# Patient Record
Sex: MALE | Race: WHITE | NOT HISPANIC OR LATINO | Employment: OTHER | URBAN - METROPOLITAN AREA
[De-identification: names, ages, dates, MRNs, and addresses within clinical notes are randomized per-mention and may not be internally consistent; named-entity substitution may affect disease eponyms.]

---

## 2021-04-15 PROBLEM — E03.9 HYPOTHYROIDISM: Status: ACTIVE | Noted: 2017-02-15

## 2021-04-15 PROBLEM — E78.5 HYPERLIPIDEMIA: Status: ACTIVE | Noted: 2018-03-23

## 2021-04-29 ENCOUNTER — HOSPITAL ENCOUNTER (OUTPATIENT)
Dept: RADIOLOGY | Facility: HOSPITAL | Age: 71
Discharge: HOME | End: 2021-04-29
Attending: NURSE PRACTITIONER
Payer: MEDICARE

## 2021-04-29 DIAGNOSIS — J18.9 PNEUMONIA DUE TO INFECTIOUS ORGANISM, UNSPECIFIED LATERALITY, UNSPECIFIED PART OF LUNG: ICD-10-CM

## 2021-04-29 PROCEDURE — 71046 X-RAY EXAM CHEST 2 VIEWS: CPT | Mod: 26,,, | Performed by: RADIOLOGY

## 2021-04-29 PROCEDURE — 71046 X-RAY EXAM CHEST 2 VIEWS: CPT | Mod: TC,PO

## 2021-04-29 PROCEDURE — 71046 XR CHEST PA AND LATERAL: ICD-10-PCS | Mod: 26,,, | Performed by: RADIOLOGY

## 2021-05-12 ENCOUNTER — HOSPITAL ENCOUNTER (OUTPATIENT)
Dept: RADIOLOGY | Facility: HOSPITAL | Age: 71
Discharge: HOME | End: 2021-05-12
Attending: NURSE PRACTITIONER
Payer: MEDICARE

## 2021-05-12 DIAGNOSIS — J18.9 PNEUMONIA DUE TO INFECTIOUS ORGANISM, UNSPECIFIED LATERALITY, UNSPECIFIED PART OF LUNG: ICD-10-CM

## 2021-05-12 PROCEDURE — 71046 X-RAY EXAM CHEST 2 VIEWS: CPT | Mod: TC,PO

## 2021-05-12 PROCEDURE — 71046 X-RAY EXAM CHEST 2 VIEWS: CPT | Mod: 26,,, | Performed by: RADIOLOGY

## 2021-05-12 PROCEDURE — 71046 XR CHEST PA AND LATERAL: ICD-10-PCS | Mod: 26,,, | Performed by: RADIOLOGY

## 2021-10-20 ENCOUNTER — TELEPHONE (OUTPATIENT)
Dept: CARDIOLOGY | Facility: CLINIC | Age: 71
End: 2021-10-20

## 2022-03-03 PROBLEM — R41.3 MEMORY CHANGE: Status: ACTIVE | Noted: 2022-03-03

## 2022-03-03 PROBLEM — Z79.899 POLYPHARMACY: Status: ACTIVE | Noted: 2022-03-03

## 2022-08-24 PROBLEM — M54.9 CHRONIC BACK PAIN: Status: ACTIVE | Noted: 2022-08-24

## 2022-08-24 PROBLEM — G89.29 CHRONIC BACK PAIN: Status: ACTIVE | Noted: 2022-08-24

## 2022-08-24 PROBLEM — D64.9 ANEMIA: Status: ACTIVE | Noted: 2022-08-24

## 2022-08-29 PROBLEM — R73.03 PREDIABETES: Status: ACTIVE | Noted: 2022-08-29

## 2023-02-01 ENCOUNTER — CLINICAL SUPPORT (OUTPATIENT)
Dept: CARDIOLOGY | Facility: CLINIC | Age: 73
End: 2023-02-01
Payer: MEDICARE

## 2023-02-01 DIAGNOSIS — Z01.810 PRE-OPERATIVE CARDIOVASCULAR EXAMINATION: ICD-10-CM

## 2023-02-01 PROCEDURE — 93010 ELECTROCARDIOGRAM REPORT: CPT | Mod: S$GLB,,, | Performed by: INTERNAL MEDICINE

## 2023-02-01 PROCEDURE — 93010 EKG 12-LEAD: ICD-10-PCS | Mod: S$GLB,,, | Performed by: INTERNAL MEDICINE

## 2023-02-02 ENCOUNTER — TELEPHONE (OUTPATIENT)
Dept: CARDIOLOGY | Facility: CLINIC | Age: 73
End: 2023-02-02
Payer: MEDICARE

## 2023-02-02 NOTE — TELEPHONE ENCOUNTER
Pt notified  of appt date time and location pb    ----- Message from Flo Booker sent at 2/2/2023  2:00 PM CST -----  Contact: [phone]  Type:  Sooner Apoointment Request    Caller is requesting a sooner appointment.  Caller declined first available appointment listed below.  Caller will not accept being placed on the waitlist and is requesting a message be sent to doctor.  Name of Caller:Roge   When is the first available appointment?3/2023  Symptoms: pre op clearance   Would the patient rather a call back or a response via [employer]? Call back   Best Call Back Number:[phone]  Additional Information: pt goes to have surgery on 2/21.        Thanks KB

## 2023-02-08 ENCOUNTER — OFFICE VISIT (OUTPATIENT)
Dept: CARDIOLOGY | Facility: CLINIC | Age: 73
End: 2023-02-08
Payer: MEDICARE

## 2023-02-08 VITALS
SYSTOLIC BLOOD PRESSURE: 136 MMHG | OXYGEN SATURATION: 96 % | HEART RATE: 98 BPM | DIASTOLIC BLOOD PRESSURE: 80 MMHG | WEIGHT: 196.44 LBS | BODY MASS INDEX: 29.87 KG/M2

## 2023-02-08 DIAGNOSIS — Z79.899 POLYPHARMACY: ICD-10-CM

## 2023-02-08 DIAGNOSIS — Z01.810 PREOPERATIVE CARDIOVASCULAR EXAMINATION: ICD-10-CM

## 2023-02-08 DIAGNOSIS — G47.33 OBSTRUCTIVE SLEEP APNEA: ICD-10-CM

## 2023-02-08 DIAGNOSIS — G89.29 CHRONIC LOW BACK PAIN, UNSPECIFIED BACK PAIN LATERALITY, UNSPECIFIED WHETHER SCIATICA PRESENT: ICD-10-CM

## 2023-02-08 DIAGNOSIS — E78.2 MIXED HYPERLIPIDEMIA: Primary | ICD-10-CM

## 2023-02-08 DIAGNOSIS — I10 PRIMARY HYPERTENSION: ICD-10-CM

## 2023-02-08 DIAGNOSIS — M54.50 CHRONIC LOW BACK PAIN, UNSPECIFIED BACK PAIN LATERALITY, UNSPECIFIED WHETHER SCIATICA PRESENT: ICD-10-CM

## 2023-02-08 PROCEDURE — 3008F BODY MASS INDEX DOCD: CPT | Mod: CPTII,S$GLB,, | Performed by: INTERNAL MEDICINE

## 2023-02-08 PROCEDURE — 99999 PR PBB SHADOW E&M-EST. PATIENT-LVL III: CPT | Mod: PBBFAC,,, | Performed by: INTERNAL MEDICINE

## 2023-02-08 PROCEDURE — 99999 PR PBB SHADOW E&M-EST. PATIENT-LVL III: ICD-10-PCS | Mod: PBBFAC,,, | Performed by: INTERNAL MEDICINE

## 2023-02-08 PROCEDURE — 3044F PR MOST RECENT HEMOGLOBIN A1C LEVEL <7.0%: ICD-10-PCS | Mod: CPTII,S$GLB,, | Performed by: INTERNAL MEDICINE

## 2023-02-08 PROCEDURE — 3044F HG A1C LEVEL LT 7.0%: CPT | Mod: CPTII,S$GLB,, | Performed by: INTERNAL MEDICINE

## 2023-02-08 PROCEDURE — 1159F PR MEDICATION LIST DOCUMENTED IN MEDICAL RECORD: ICD-10-PCS | Mod: CPTII,S$GLB,, | Performed by: INTERNAL MEDICINE

## 2023-02-08 PROCEDURE — 99205 PR OFFICE/OUTPT VISIT, NEW, LEVL V, 60-74 MIN: ICD-10-PCS | Mod: S$GLB,,, | Performed by: INTERNAL MEDICINE

## 2023-02-08 PROCEDURE — 3079F DIAST BP 80-89 MM HG: CPT | Mod: CPTII,S$GLB,, | Performed by: INTERNAL MEDICINE

## 2023-02-08 PROCEDURE — 1159F MED LIST DOCD IN RCRD: CPT | Mod: CPTII,S$GLB,, | Performed by: INTERNAL MEDICINE

## 2023-02-08 PROCEDURE — 3075F PR MOST RECENT SYSTOLIC BLOOD PRESS GE 130-139MM HG: ICD-10-PCS | Mod: CPTII,S$GLB,, | Performed by: INTERNAL MEDICINE

## 2023-02-08 PROCEDURE — 3079F PR MOST RECENT DIASTOLIC BLOOD PRESSURE 80-89 MM HG: ICD-10-PCS | Mod: CPTII,S$GLB,, | Performed by: INTERNAL MEDICINE

## 2023-02-08 PROCEDURE — 4010F ACE/ARB THERAPY RXD/TAKEN: CPT | Mod: CPTII,S$GLB,, | Performed by: INTERNAL MEDICINE

## 2023-02-08 PROCEDURE — 3008F PR BODY MASS INDEX (BMI) DOCUMENTED: ICD-10-PCS | Mod: CPTII,S$GLB,, | Performed by: INTERNAL MEDICINE

## 2023-02-08 PROCEDURE — 4010F PR ACE/ARB THEARPY RXD/TAKEN: ICD-10-PCS | Mod: CPTII,S$GLB,, | Performed by: INTERNAL MEDICINE

## 2023-02-08 PROCEDURE — 99205 OFFICE O/P NEW HI 60 MIN: CPT | Mod: S$GLB,,, | Performed by: INTERNAL MEDICINE

## 2023-02-08 PROCEDURE — 3075F SYST BP GE 130 - 139MM HG: CPT | Mod: CPTII,S$GLB,, | Performed by: INTERNAL MEDICINE

## 2023-02-08 NOTE — PROGRESS NOTES
Subjective:   Patient ID:  Roge Roberson is a 72 y.o. male who presents for evaluation of No chief complaint on file.      HPI  72-year-old male, never smoker, no known history of CAD.    States that he had seen Dr. Barreto about 2 years ago.  He had a normal stress test.    He denies whatsoever any chest pain.  He has lumbar disease.  He is going for a lateral spine surgery to release his nerve.    He states that he has unbearable pain in his feet.    He denies any chest pain, dyspnea on exertion, palpitations syncope or presyncope.    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  He is only limited by his feet however not limited with his upper body and denies any pain with exertion.    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  [facility] shows also the same findings.    The EKG itself was not available for me.    He has severe obstructive sleep apnea.  He has stopped using his CPAP.  He states he can go back to it.      Past Medical History:   Diagnosis Date    Hypertension        Past Surgical History:   Procedure Laterality Date    CHOLECYSTECTOMY      INSERTION OF INTRATHECAL CATHETER WITH TRIAL ADMINISTRATION OF PAIN MEDICATION         Social History     Tobacco Use    Smoking status: Never    Smokeless tobacco: Never   Substance Use Topics    Alcohol use: Not Currently    Drug use: Never       History reviewed. No pertinent family history.    Review of Systems   Constitutional: Negative for fever and malaise/fatigue.   HENT:  Negative for sore throat.    Eyes:  Negative for blurred vision.   Cardiovascular:  Negative for chest pain, claudication, cyanosis, dyspnea on exertion, irregular heartbeat, leg swelling, near-syncope, orthopnea, palpitations, paroxysmal nocturnal dyspnea and syncope.   Respiratory:  Negative for cough and hemoptysis.    Hematologic/Lymphatic: Negative for bleeding problem.   Skin:   Negative for rash.   Musculoskeletal:  Positive for arthritis.   Gastrointestinal:  Negative for abdominal pain.   Genitourinary: Negative.    Neurological: Negative.    Psychiatric/Behavioral:  Negative for altered mental status and substance abuse.      Current Outpatient Medications on File Prior to Visit   Medication Sig    amLODIPine (NORVASC) 10 MG tablet Take 1 tablet (10 mg total) by mouth once daily.    FLUoxetine 20 MG capsule Take 1 capsule (20 mg total) by mouth 2 (two) times daily.    hydrOXYzine pamoate (VISTARIL) 50 MG Cap TAKE ONE CAPSULE BY MOUTH EVERY NIGHT AT BEDTIME FOR ANXIETY    levothyroxine (SYNTHROID) 75 MCG tablet TAKE 1 TABLET(75 MCG) BY MOUTH BEFORE BREAKFAST    linaCLOtide (LINZESS) 290 mcg Cap capsule Take 1 capsule (290 mcg total) by mouth once daily.    lisinopriL 10 MG tablet Take 1 tablet (10 mg total) by mouth once daily.    mirtazapine (REMERON) 30 MG tablet Take 0.5 tablets (15 mg total) by mouth nightly.    pregabalin (LYRICA) 150 MG capsule TAKE ONE CAPSULE BY MOUTH THREE TIMES DAILY FOR NERVE PAIN    rosuvastatin (CRESTOR) 20 MG tablet Take 1 tablet (20 mg total) by mouth every evening.    tiZANidine (ZANAFLEX) 4 MG tablet Take 4 mg by mouth 3 (three) times daily.    LIDOcaine (LIDODERM) 5 % APPLY 1 TO 3 PATCHES TOPICALLY TO THE SKIN EVERY DAY. MAY WEAR UP TO 12 HOURS     No current facility-administered medications on file prior to visit.       Objective:   Objective:  Wt Readings from Last 3 Encounters:   02/08/23 89.1 kg (196 lb 6.9 oz)   02/01/23 88.7 kg (195 lb 9.6 oz)   01/26/23 87.1 kg (192 lb)     Temp Readings from Last 3 Encounters:   07/27/21 98.5 °F (36.9 °C) (Oral)   05/11/21 98.2 °F (36.8 °C) (Oral)   04/29/21 98.4 °F (36.9 °C) (Oral)     BP Readings from Last 3 Encounters:   02/08/23 136/80   02/02/23 132/74   02/01/23 (!) 157/80     Pulse Readings from Last 3 Encounters:   02/08/23 98   02/01/23 82   01/26/23 74       Physical Exam  Vitals reviewed.    Constitutional:       Appearance: He is well-developed.   HENT:      Head: Normocephalic and atraumatic.   Eyes:      General: No scleral icterus.     Conjunctiva/sclera: Conjunctivae normal.   Cardiovascular:      Rate and Rhythm: Normal rate and regular rhythm.      Pulses: Intact distal pulses.      Heart sounds: Normal heart sounds. No murmur heard.  Pulmonary:      Effort: No respiratory distress.      Breath sounds: No wheezing or rales.   Chest:      Chest wall: No tenderness.   Abdominal:      General: Bowel sounds are normal. There is no distension.      Palpations: Abdomen is soft.      Tenderness: There is no guarding.   Musculoskeletal:         General: Normal range of motion.      Cervical back: Normal range of motion and neck supple.   Skin:     General: Skin is warm.   Neurological:      Mental Status: He is alert and oriented to person, place, and time.       Lab Results   Component Value Date    CHOL 153 08/26/2022    CHOL 114 03/09/2022    CHOL 152 11/03/2021     Lab Results   Component Value Date    HDL 43 08/26/2022    HDL 44 03/09/2022    HDL 39 (L) 11/03/2021     Lab Results   Component Value Date    LDLCALC 84 08/26/2022    LDLCALC 52 03/09/2022    LDLCALC 79 11/03/2021     Lab Results   Component Value Date    TRIG 147 08/26/2022    TRIG 92 03/09/2022    TRIG 200 (H) 11/03/2021     No results found for: CHOLHDL    Chemistry        Component Value Date/Time     01/27/2023 0724    K 4.2 01/27/2023 0724     01/27/2023 0724    CO2 26 01/27/2023 0724    BUN 20 01/27/2023 0724    CREATININE 0.72 (L) 01/27/2023 0724     (H) 01/27/2023 0724        Component Value Date/Time    CALCIUM 9.6 01/27/2023 0724    ALKPHOS 102 03/30/2005 0825    AST 28 01/27/2023 0724    ALT 26 01/27/2023 0724    BILITOT 0.3 01/27/2023 0724    EGFRNONAA 83 11/03/2021 0701          Lab Results   Component Value Date    TSH 1.220 08/26/2022     Lab Results   Component Value Date    INR CANCELED 02/01/2023      Lab Results   Component Value Date    WBC 6.0 01/27/2023    HGB 13.1 01/27/2023    HCT 40.0 01/27/2023    MCV 89 01/27/2023     01/27/2023     BNP  @LABRCNTIP(BNP,BNPTRIAGEBLO)@  CrCl cannot be calculated (Patient's most recent lab result is older than the maximum 7 days allowed.).     Imaging:  ======  No results found for this or any previous visit.    No results found for this or any previous visit.    No results found for this or any previous visit.    Results for orders placed during the hospital encounter of 05/12/21    X-Ray Chest PA And Lateral    Narrative  EXAMINATION:  XR CHEST PA AND LATERAL    CLINICAL HISTORY:  Pneumonia, unspecified organism    TECHNIQUE:  PA and lateral views of the chest were performed.    COMPARISON:  April 29, 2021    FINDINGS:  Persistent basilar areas of the scarring and or subsegmental atelectasis with slight increased prominence on the right.  No new or developing area of consolidation, effusion or pneumothorax.  Bilateral smooth apical pleural thickening.  Heart and pulmonary vasculature stable and aorta tortuous.  Osteopenia and spondylosis.    Impression  Stable exam.  There is persistent bibasilar scarring and or subsegmental atelectasis without developing consolidation or effusion.  See above.  Follow-up as warranted.      Electronically signed by: Sanket Suarez MD  Date:    05/12/2021  Time:    14:57    No results found for this or any previous visit.    No valid procedures specified.    Diagnostic Results:  ECG: Reviewed  Normal sinus rhythm.  Left anterior fascicular block.  Right bundle branch block.  The 10-year ASCVD risk score (Vineet DK, et al., 2019) is: 23.1%    Values used to calculate the score:      Age: 72 years      Sex: Male      Is Non- : No      Diabetic: No      Tobacco smoker: No      Systolic Blood Pressure: 136 mmHg      Is BP treated: Yes      HDL Cholesterol: 43 mg/dL      Total Cholesterol: 153  mg/dL    Assessment and Plan:   Mixed hyperlipidemia    Primary hypertension    Polypharmacy    Chronic low back pain, unspecified back pain laterality, unspecified whether sciatica present    Preoperative cardiovascular examination      Reviewed all tests and above medical conditions with patient in detail and formulated treatment plan.  Risk factor modification discussed.   Cardiac low salt diet discussed.  Maintaining healthy weight and weight loss goals were discussed in clinic.  Continue with statin perioperatively.    No history of insulin-dependent diabetes, CKD, CHF, CVA or CAD.  Revised cardiac index score of 0  No active cardiac complaints.  I see no contraindication for him to go for his spinal surgery.  He is stable from cardiac standpoint.    CPAP compliance  I reviewed his EKG.    Get records from [facility]  Follow up in 6 months

## 2023-02-13 ENCOUNTER — HOSPITAL ENCOUNTER (OUTPATIENT)
Dept: RADIOLOGY | Facility: HOSPITAL | Age: 73
Discharge: HOME | End: 2023-02-13
Attending: NURSE PRACTITIONER
Payer: MEDICARE

## 2023-02-13 DIAGNOSIS — Z01.810 PRE-OPERATIVE CARDIOVASCULAR EXAMINATION: ICD-10-CM

## 2023-02-13 PROCEDURE — 71046 X-RAY EXAM CHEST 2 VIEWS: CPT | Mod: 26,,, | Performed by: STUDENT IN AN ORGANIZED HEALTH CARE EDUCATION/TRAINING PROGRAM

## 2023-02-13 PROCEDURE — 71046 XR CHEST PA AND LATERAL: ICD-10-PCS | Mod: 26,,, | Performed by: STUDENT IN AN ORGANIZED HEALTH CARE EDUCATION/TRAINING PROGRAM

## 2023-02-13 PROCEDURE — 71046 X-RAY EXAM CHEST 2 VIEWS: CPT | Mod: TC,PO

## 2023-05-04 PROBLEM — F32.4 MAJOR DEPRESSIVE DISORDER WITH SINGLE EPISODE, IN PARTIAL REMISSION: Status: ACTIVE | Noted: 2023-05-04

## 2023-05-18 ENCOUNTER — HOSPITAL ENCOUNTER (OUTPATIENT)
Dept: RADIOLOGY | Facility: HOSPITAL | Age: 73
Discharge: HOME | End: 2023-05-18
Attending: NURSE PRACTITIONER
Payer: MEDICARE

## 2023-05-18 DIAGNOSIS — K59.00 CONSTIPATION, UNSPECIFIED CONSTIPATION TYPE: ICD-10-CM

## 2023-05-18 PROCEDURE — 74019 XR ABDOMEN FLAT AND ERECT: ICD-10-PCS | Mod: 26,,, | Performed by: RADIOLOGY

## 2023-05-18 PROCEDURE — 74019 RADEX ABDOMEN 2 VIEWS: CPT | Mod: TC,PO

## 2023-05-18 PROCEDURE — 74019 RADEX ABDOMEN 2 VIEWS: CPT | Mod: 26,,, | Performed by: RADIOLOGY

## 2023-11-30 ENCOUNTER — TELEPHONE (OUTPATIENT)
Dept: OTOLARYNGOLOGY | Facility: CLINIC | Age: 73
End: 2023-11-30
Payer: MEDICARE

## 2023-11-30 NOTE — TELEPHONE ENCOUNTER
----- Message from Sigrid Stringer sent at 11/30/2023  1:58 PM CST -----  Contact: Roge Guan is calling to speak to the nurse regarding scheduling for a audiogram and a appointment for a ent  he currently having ringing in his ears, I tried to schedule but it wouldn't allow me. Please give him a call at [phone]    Thanks  LJ

## 2023-12-12 ENCOUNTER — CLINICAL SUPPORT (OUTPATIENT)
Dept: AUDIOLOGY | Facility: CLINIC | Age: 73
End: 2023-12-12
Payer: MEDICARE

## 2023-12-12 ENCOUNTER — OFFICE VISIT (OUTPATIENT)
Dept: OTOLARYNGOLOGY | Facility: CLINIC | Age: 73
End: 2023-12-12
Payer: MEDICARE

## 2023-12-12 VITALS — WEIGHT: 190.06 LBS | BODY MASS INDEX: 28.89 KG/M2

## 2023-12-12 DIAGNOSIS — H93.13 TINNITUS, BILATERAL: ICD-10-CM

## 2023-12-12 DIAGNOSIS — H90.3 SENSORINEURAL HEARING LOSS, BILATERAL: Primary | ICD-10-CM

## 2023-12-12 DIAGNOSIS — H61.23 BILATERAL IMPACTED CERUMEN: ICD-10-CM

## 2023-12-12 DIAGNOSIS — H90.3 SENSORINEURAL HEARING LOSS (SNHL) OF BOTH EARS: Primary | ICD-10-CM

## 2023-12-12 DIAGNOSIS — H93.13 RINGING IN EARS, BILATERAL: ICD-10-CM

## 2023-12-12 PROCEDURE — 4010F PR ACE/ARB THEARPY RXD/TAKEN: ICD-10-PCS | Mod: CPTII,S$GLB,, | Performed by: OTOLARYNGOLOGY

## 2023-12-12 PROCEDURE — 3044F PR MOST RECENT HEMOGLOBIN A1C LEVEL <7.0%: ICD-10-PCS | Mod: CPTII,S$GLB,, | Performed by: OTOLARYNGOLOGY

## 2023-12-12 PROCEDURE — 3008F BODY MASS INDEX DOCD: CPT | Mod: CPTII,S$GLB,, | Performed by: OTOLARYNGOLOGY

## 2023-12-12 PROCEDURE — 92557 COMPREHENSIVE HEARING TEST: CPT | Mod: S$GLB,,, | Performed by: AUDIOLOGIST-HEARING AID FITTER

## 2023-12-12 PROCEDURE — 92557 PR COMPREHENSIVE HEARING TEST: ICD-10-PCS | Mod: S$GLB,,, | Performed by: AUDIOLOGIST-HEARING AID FITTER

## 2023-12-12 PROCEDURE — 3288F FALL RISK ASSESSMENT DOCD: CPT | Mod: CPTII,S$GLB,, | Performed by: OTOLARYNGOLOGY

## 2023-12-12 PROCEDURE — 1101F PR PT FALLS ASSESS DOC 0-1 FALLS W/OUT INJ PAST YR: ICD-10-PCS | Mod: CPTII,S$GLB,, | Performed by: OTOLARYNGOLOGY

## 2023-12-12 PROCEDURE — 4010F ACE/ARB THERAPY RXD/TAKEN: CPT | Mod: CPTII,S$GLB,, | Performed by: OTOLARYNGOLOGY

## 2023-12-12 PROCEDURE — 69210 REMOVE IMPACTED EAR WAX UNI: CPT | Mod: S$GLB,,, | Performed by: OTOLARYNGOLOGY

## 2023-12-12 PROCEDURE — 1159F PR MEDICATION LIST DOCUMENTED IN MEDICAL RECORD: ICD-10-PCS | Mod: CPTII,S$GLB,, | Performed by: OTOLARYNGOLOGY

## 2023-12-12 PROCEDURE — 92567 TYMPANOMETRY: CPT | Mod: S$GLB,,, | Performed by: AUDIOLOGIST-HEARING AID FITTER

## 2023-12-12 PROCEDURE — 1126F AMNT PAIN NOTED NONE PRSNT: CPT | Mod: CPTII,S$GLB,, | Performed by: OTOLARYNGOLOGY

## 2023-12-12 PROCEDURE — 92567 PR TYMPA2METRY: ICD-10-PCS | Mod: S$GLB,,, | Performed by: AUDIOLOGIST-HEARING AID FITTER

## 2023-12-12 PROCEDURE — 3044F HG A1C LEVEL LT 7.0%: CPT | Mod: CPTII,S$GLB,, | Performed by: OTOLARYNGOLOGY

## 2023-12-12 PROCEDURE — 99203 OFFICE O/P NEW LOW 30 MIN: CPT | Mod: 25,S$GLB,, | Performed by: OTOLARYNGOLOGY

## 2023-12-12 PROCEDURE — 1159F MED LIST DOCD IN RCRD: CPT | Mod: CPTII,S$GLB,, | Performed by: OTOLARYNGOLOGY

## 2023-12-12 PROCEDURE — 99203 PR OFFICE/OUTPT VISIT, NEW, LEVL III, 30-44 MIN: ICD-10-PCS | Mod: 25,S$GLB,, | Performed by: OTOLARYNGOLOGY

## 2023-12-12 PROCEDURE — 1126F PR PAIN SEVERITY QUANTIFIED, NO PAIN PRESENT: ICD-10-PCS | Mod: CPTII,S$GLB,, | Performed by: OTOLARYNGOLOGY

## 2023-12-12 PROCEDURE — 3008F PR BODY MASS INDEX (BMI) DOCUMENTED: ICD-10-PCS | Mod: CPTII,S$GLB,, | Performed by: OTOLARYNGOLOGY

## 2023-12-12 PROCEDURE — 99999 PR PBB SHADOW E&M-EST. PATIENT-LVL III: ICD-10-PCS | Mod: PBBFAC,,, | Performed by: OTOLARYNGOLOGY

## 2023-12-12 PROCEDURE — 99999 PR PBB SHADOW E&M-EST. PATIENT-LVL III: CPT | Mod: PBBFAC,,, | Performed by: OTOLARYNGOLOGY

## 2023-12-12 PROCEDURE — 1101F PT FALLS ASSESS-DOCD LE1/YR: CPT | Mod: CPTII,S$GLB,, | Performed by: OTOLARYNGOLOGY

## 2023-12-12 PROCEDURE — 3288F PR FALLS RISK ASSESSMENT DOCUMENTED: ICD-10-PCS | Mod: CPTII,S$GLB,, | Performed by: OTOLARYNGOLOGY

## 2023-12-12 PROCEDURE — 69210 PR REMOVAL IMPACTED CERUMEN REQUIRING INSTRUMENTATION, UNILATERAL: ICD-10-PCS | Mod: S$GLB,,, | Performed by: OTOLARYNGOLOGY

## 2023-12-12 NOTE — PROGRESS NOTES
Referring Provider:    Tabitha Mondragon, Sal  [address] [facility]  [address]  [place] [address]  Subjective:   Patient: Roge Roberson [id number], :1950   Visit date:2023 2:01 PM    Chief Complaint:  Tinnitus (Both ears are ringing but right is worse , started some months ago , )    HPI:    Prior notes reviewed by myself.  Clinical documentation obtained by nursing staff reviewed.     73-year-old gentleman presents for evaluation of bilateral nonpulsatile tinnitus.  He started noticing this more over the last few months.  He does not have any significant prior otologic history.  He did have a audiogram earlier today      Objective:     Physical Exam:  Vitals:  Wt 86.2 kg (190 lb 0.6 oz)   BMI 28.89 kg/m²   General appearance:  Well developed, well nourished    Ears:  Otoscopy of external auditory canals with 75% cerumen impaction and tympanic membranes was normal, clinical speech reception thresholds grossly intact, no mass/lesion of auricle.    Nose:  No masses/lesions of external nose, nasal mucosa, septum, and turbinates were within normal limits.    Mouth:  No mass/lesion of lips, teeth, gums, hard/soft palate, tongue, tonsils, or oropharynx.    Neck & Lymphatics:  No cervical lymphadenopathy, no neck mass/crepitus/ asymmetry, trachea is midline, no thyroid enlargement/tenderness/mass.    Procedure Note    CHIEF COMPLAINT:  Cerumen Impaction    Description:  The patient was seated in an exam chair.  An ear speculum was placed in the right EAC and was examined under the microscope.  Suction and/or loop curettes were used to remove a large cerumen impaction.  The tympanic membrane was visualized and was normal in appearance.  The procedure was repeated on the left side in a similar fashion.  The TM was intact and normal on this side as well.  The patient tolerated the procedure well.      [x]  Data Reviewed:    Lab Results   Component Value Date    WBC 7.3 2023    HGB 15.0 2023    HCT  48.1 05/08/2023    MCV 97 05/08/2023    EOSINOPHIL 2 05/08/2023         [x]  Independent interpretation of test: mild SNHL low frequencies sloping to moderate/severe in HF  Roge Roberson was seen 12/12/2023 for an audiological evaluation.  Patient complains of tinnitus in the bilateral ear that has been present for years, described as ringing. Progressively gotten louder. He endorses hearing loss, bilateral of gradual decline. He notes right poorer than left hearing. No aural fullness, pressure or otalgia. No history of otologic surgery. No family history of hearing loss. Has history of recreational loud music exposure.     Otoscopy revealed non-occluding cerumen in each ear.   Audiogram results reveal a mild-to-moderate sensorineural hearing loss 250-8000 Hz for the right ear, and a mild-to-moderate sensorineural hearing loss 250-8000 Hz for the left ear.   Speech Reception Thresholds were 35 dBHL for the right ear and 35 dBHL for the left ear.   Word recognition scores were good for the right ear and good for the left ear.   Tympanograms were Type A for the right ear and Type A for the left ear.     Patient was counseled on the above findings.     Recommendations:  Hearing aids, binaural. Patient is provided a copy of his audiogram and may check into [facility].             Assessment & Plan:   Sensorineural hearing loss (SNHL) of both ears    Ringing in ears, bilateral  -     Ambulatory referral/consult to ENT    Bilateral impacted cerumen      We reviewed his audiogram which demonstrates mild hearing loss in the low frequencies downsloping to moderate/severe hearing loss in the high frequencies.  We discussed the relationship between hearing loss and tinnitus.  He is interested in exploring his options for amplification.  He can follow up p.r.n.    We reviewed his audiogram together in detail.  We also discussed that tinnitus is most often caused by a hearing loss, and that as the hair cells are damaged,  either genetic or as a result of loud noise exposure, they then cause tinnitus.  Some patients find that restricting the salt or caffeine in their diet helps, and there is also an OTC supplement, lipoflavonoids, that some people find to be effective though their benefit is not fully proven. Arches is another supplement that some patients have seen improvement with.  Tinnitus tends to be louder in times of stress and fatigue, and may decrease with time.  Sound machines may also be an effective masking technique if needed at night.        Earwax (Treated)    Everyone produces earwax from the lining of the ear canal. It lubricates and protects the ear. The wax that forms in the canal slowly moves toward the outside of the ear and falls out. Sometimes wax can build up in the ear canal. This can cause a blockage and loss of hearing. A buildup of earwax was removed from your ear today.  Home care  If you have a tendency to build up wax in the ear canal, you should clear the wax at home regularly, before it causes discomfort. This should be about once every six months.  Unless a medicine was prescribed, you may use an over-the-counter product made for clearing earwax. These contain carbamide peroxide and are available over-the-counter in a kit with a small bulb syringe.  Lie down with the blocked ear facing upward. Apply one dropper full of medicine and wait a few minutes. Grasp the outer ear and wiggle it to help the solution enter the canal.  Lean over a sink or basin with the blocked ear turned downward. Use a rubber bulb syringe filled with warm (not hot or cold) water to rinse the ear several times. Use gentle pressure only. You may need to repeat the irrigation several times before the wax flows out.  If you are having trouble draining all the water out of your ear canal, put a few drops of rubbing alcohol into the ear canal. This will help remove the remaining water.  Don'ts  Dont use cold water to rinse the ear.  This will make you dizzy.  Dont do this procedure if you have an ear infection. Symptoms include ear pain, fever, or fluid draining from the ear.  Dont do this procedure if you have a punctured eardrum.  Dont use cotton swabs, matches, hairpins, keys, or other objects to clean the ear canal. This can cause infection of the ear canal or rupture of the eardrum. Because of their size and shape, cotton swabs can push the earwax deeper into the ear canal instead of removing it.  Follow-up care  Follow up with your healthcare provider, or as advised.  When to seek medical advice  Call your healthcare provider right away if any of these occur:  Worsening ear pain  Fever of 100.4°F (38°C) or higher, or as directed by your healthcare provider  Hearing does not return to normal after three days of treatment  Fluid drainage or bleeding from the ear canal  Swelling, redness, or tenderness of the outer ear  Headache, neck pain, or stiff neck  Date Last Reviewed: 3/22/2015  © 3681-2939 [employer] [address] [place] [address], [place] [address]. All rights reserved. This information is not intended as a substitute for professional medical care. Always follow your healthcare professional's instructions.

## 2023-12-12 NOTE — PROGRESS NOTES
Referring provider: Dr. Wayne Roberson was seen 12/12/2023 for an audiological evaluation.  Patient complains of tinnitus in the bilateral ear that has been present for years, described as ringing. Progressively gotten louder. He endorses hearing loss, bilateral of gradual decline. He notes right poorer than left hearing. No aural fullness, pressure or otalgia. No history of otologic surgery. No family history of hearing loss. Has history of recreational loud music exposure.    Otoscopy revealed non-occluding cerumen in each ear.   Audiogram results reveal a mild-to-moderate sensorineural hearing loss 250-8000 Hz for the right ear, and a mild-to-moderate sensorineural hearing loss 250-8000 Hz for the left ear.   Speech Reception Thresholds were 35 dBHL for the right ear and 35 dBHL for the left ear.   Word recognition scores were good for the right ear and good for the left ear.   Tympanograms were Type A for the right ear and Type A for the left ear.    Patient was counseled on the above findings.    Recommendations:  Hearing aids, binaural. Patient is provided a copy of his audiogram and may check into [facility].

## 2024-01-08 ENCOUNTER — OFFICE VISIT (OUTPATIENT)
Dept: UROLOGY | Facility: CLINIC | Age: 74
End: 2024-01-08
Payer: MEDICARE

## 2024-01-08 VITALS
BODY MASS INDEX: 28.89 KG/M2 | DIASTOLIC BLOOD PRESSURE: 70 MMHG | SYSTOLIC BLOOD PRESSURE: 111 MMHG | TEMPERATURE: 97 F | RESPIRATION RATE: 18 BRPM | HEIGHT: 68 IN | HEART RATE: 92 BPM

## 2024-01-08 DIAGNOSIS — R39.198 DIFFICULTY URINATING: Primary | ICD-10-CM

## 2024-01-08 LAB
BILIRUB UR QL STRIP: NEGATIVE
GLUCOSE UR QL STRIP: NEGATIVE
KETONES UR QL STRIP: NEGATIVE
LEUKOCYTE ESTERASE UR QL STRIP: NEGATIVE
PH, POC UA: 6
POC BLOOD, URINE: NEGATIVE
POC NITRATES, URINE: NEGATIVE
POC RESIDUAL URINE VOLUME: 133 ML (ref 0–100)
PROT UR QL STRIP: NEGATIVE
SP GR UR STRIP: 1.01 (ref 1–1.03)
UROBILINOGEN UR STRIP-ACNC: 0.2 (ref 0.3–2.2)

## 2024-01-08 PROCEDURE — 3078F DIAST BP <80 MM HG: CPT | Mod: CPTII,S$GLB,, | Performed by: NURSE PRACTITIONER

## 2024-01-08 PROCEDURE — 1126F AMNT PAIN NOTED NONE PRSNT: CPT | Mod: CPTII,S$GLB,, | Performed by: NURSE PRACTITIONER

## 2024-01-08 PROCEDURE — 99204 OFFICE O/P NEW MOD 45 MIN: CPT | Mod: S$GLB,,, | Performed by: NURSE PRACTITIONER

## 2024-01-08 PROCEDURE — 1101F PT FALLS ASSESS-DOCD LE1/YR: CPT | Mod: CPTII,S$GLB,, | Performed by: NURSE PRACTITIONER

## 2024-01-08 PROCEDURE — 1159F MED LIST DOCD IN RCRD: CPT | Mod: CPTII,S$GLB,, | Performed by: NURSE PRACTITIONER

## 2024-01-08 PROCEDURE — 3074F SYST BP LT 130 MM HG: CPT | Mod: CPTII,S$GLB,, | Performed by: NURSE PRACTITIONER

## 2024-01-08 PROCEDURE — 3288F FALL RISK ASSESSMENT DOCD: CPT | Mod: CPTII,S$GLB,, | Performed by: NURSE PRACTITIONER

## 2024-01-08 PROCEDURE — 51798 US URINE CAPACITY MEASURE: CPT | Mod: S$GLB,,, | Performed by: NURSE PRACTITIONER

## 2024-01-08 PROCEDURE — 81003 URINALYSIS AUTO W/O SCOPE: CPT | Mod: QW,S$GLB,, | Performed by: NURSE PRACTITIONER

## 2024-01-08 PROCEDURE — 3008F BODY MASS INDEX DOCD: CPT | Mod: CPTII,S$GLB,, | Performed by: NURSE PRACTITIONER

## 2024-01-08 PROCEDURE — 99999 PR PBB SHADOW E&M-EST. PATIENT-LVL IV: CPT | Mod: PBBFAC,,, | Performed by: NURSE PRACTITIONER

## 2024-01-08 RX ORDER — TAMSULOSIN HYDROCHLORIDE 0.4 MG/1
0.4 CAPSULE ORAL DAILY
Qty: 30 CAPSULE | Refills: 11 | Status: SHIPPED | OUTPATIENT
Start: 2024-01-08 | End: 2025-01-07

## 2024-01-08 NOTE — PROGRESS NOTES
Chief Complaint:   BPH    HPI:   Patient is a 73-year-old male that is presenting with nocturia, up to 3-4 times nightly and a slow urinary stream.  No BPH meds.  In reviewing EMR, PSA was normal 0.7.  Denies  cancers in his family.  Denies gross hematuria.  Urine in clinic is negative, PVR was elevated to 210 mL.    Allergies:  Patient has no known allergies.    Medications:  has a current medication list which includes the following prescription(s): amlodipine, fluoxetine, lactulose, levothyroxine, linzess, lisinopril, multivitamin, naloxegol oxalate, ondansetron, pregabalin, rosuvastatin, sodium chloride 0.9% SolP with HYDROmorphone (PF) 2 mg/mL Soln 10 mcg/mL, BUPivacaine 0.5 % (5 mg/mL) Soln 0.0625 %, belsomra, and tizanidine.    Review of Systems:  General: No fever, chills, fatigability, or weight loss.  Skin: No rashes, itching, or changes in color or texture of skin.  Chest: Denies NDIAYE, cyanosis, wheezing, cough, and sputum production.  Abdomen: Appetite fine. No weight loss. Denies diarrhea, abdominal pain, hematemesis, or blood in stool.  Musculoskeletal: No joint stiffness or swelling. Denies back pain.  : As above.  All other review of systems negative.    PMH:   has a past medical history of Hypertension.    PSH:   has a past surgical history that includes Insertion of intrathecal catheter with trial administration of pain medication and Cholecystectomy.    FamHx: family history is not on file.    SocHx:  reports that he has never smoked. He has never used smokeless tobacco. He reports that he does not currently use alcohol. He reports that he does not use drugs.      Physical Exam:  Vitals:    01/08/24 1308   BP: 111/70   Pulse: 92   Resp: 18   Temp: 97.1 °F (36.2 °C)     General: A&Ox3, no apparent distress, no deformities  Neck: No masses, normal thyroid  Lungs: normal inspiration, no use of accessory muscles  Heart: normal pulse, no arrhythmias  Abdomen: Soft, NT, ND, no masses, no hernias, no  hepatosplenomegaly  Lymphatic: Neck and groin nodes negative  Skin: The skin is warm and dry. No jaundice.  Ext: No c/c/e.  : Test desc ralph, no abnormalities of epididymus. Penis  with normal penile and scrotal skin. Meatus normal. Normal rectal tone, no hemorrhoids. Prost 35 gm no nodules or masses appreciated. SV not palpable. Perineum and anus normal.    Labs/Studies:    Latest Reference Range & Units 01/27/23 07:24 02/01/23 10:21   PSA - [facility] 0.0 - 4.0 ng/mL 0.7 0.7       Impression/Plan:   BPH  Patient was prescribed tamsulosin and will return to clinic in 4 weeks for re-evaluation

## 2024-01-08 NOTE — PROGRESS NOTES
Chief Complaint:       HPI:       Allergies:  Patient has no known allergies.    Medications:  has a current medication list which includes the following prescription(s): amlodipine, fluoxetine, lactulose, levothyroxine, linzess, lisinopril, multivitamin, naloxegol oxalate, ondansetron, pregabalin, rosuvastatin, sodium chloride 0.9% SolP with HYDROmorphone (PF) 2 mg/mL Soln 10 mcg/mL, BUPivacaine 0.5 % (5 mg/mL) Soln 0.0625 %, belsomra, and tizanidine.    Review of Systems:  General: No fever, chills, fatigability, or weight loss.  Skin: No rashes, itching, or changes in color or texture of skin.  Chest: Denies NDIAYE, cyanosis, wheezing, cough, and sputum production.  Abdomen: Appetite fine. No weight loss. Denies diarrhea, abdominal pain, hematemesis, or blood in stool.  Musculoskeletal: No joint stiffness or swelling. Denies back pain.  : As above.  All other review of systems negative.    PMH:   has a past medical history of Hypertension.    PSH:   has a past surgical history that includes Insertion of intrathecal catheter with trial administration of pain medication and Cholecystectomy.    FamHx: none    SocHx:  reports that he has never smoked. He has never used smokeless tobacco. He reports that he does not currently use alcohol. He reports that he does not use drugs.      Physical Exam:  Vitals:    01/08/24 1308   BP: 111/70   Pulse: 92   Resp: 18   Temp: 97.1 °F (36.2 °C)     General: A&Ox3, no apparent distress, no deformities  Neck: No masses, normal thyroid  Lungs: normal inspiration, no use of accessory muscles  Heart: normal pulse, no arrhythmias  Abdomen: Soft, NT, ND, no masses, no hernias, no hepatosplenomegaly  Lymphatic: Neck and groin nodes negative  Skin: The skin is warm and dry. No jaundice.  Ext: No c/c/e.  : Test desc ralph, no abnormalities of epididymus. Penis  with normal penile and scrotal skin. Meatus normal. Normal rectal tone, no hemorrhoids. Prost  gm no nodules or masses appreciated.  SV not palpable. Perineum and anus normal.    Labs/Studies: ***    Impression/Plan: ***

## 2024-02-19 ENCOUNTER — OFFICE VISIT (OUTPATIENT)
Dept: UROLOGY | Facility: CLINIC | Age: 74
End: 2024-02-19
Payer: MEDICARE

## 2024-02-19 VITALS
HEIGHT: 68 IN | RESPIRATION RATE: 16 BRPM | DIASTOLIC BLOOD PRESSURE: 63 MMHG | WEIGHT: 198.5 LBS | BODY MASS INDEX: 30.08 KG/M2 | SYSTOLIC BLOOD PRESSURE: 105 MMHG

## 2024-02-19 DIAGNOSIS — N40.0 BENIGN PROSTATIC HYPERPLASIA WITHOUT LOWER URINARY TRACT SYMPTOMS: Primary | ICD-10-CM

## 2024-02-19 PROBLEM — M54.9 CHRONIC BACK PAIN: Status: RESOLVED | Noted: 2022-08-24 | Resolved: 2024-02-19

## 2024-02-19 PROBLEM — G89.29 CHRONIC BACK PAIN: Status: RESOLVED | Noted: 2022-08-24 | Resolved: 2024-02-19

## 2024-02-19 PROCEDURE — 3074F SYST BP LT 130 MM HG: CPT | Mod: CPTII,S$GLB,, | Performed by: NURSE PRACTITIONER

## 2024-02-19 PROCEDURE — 3078F DIAST BP <80 MM HG: CPT | Mod: CPTII,S$GLB,, | Performed by: NURSE PRACTITIONER

## 2024-02-19 PROCEDURE — 1126F AMNT PAIN NOTED NONE PRSNT: CPT | Mod: CPTII,S$GLB,, | Performed by: NURSE PRACTITIONER

## 2024-02-19 PROCEDURE — 3008F BODY MASS INDEX DOCD: CPT | Mod: CPTII,S$GLB,, | Performed by: NURSE PRACTITIONER

## 2024-02-19 PROCEDURE — 99999 PR PBB SHADOW E&M-EST. PATIENT-LVL III: CPT | Mod: PBBFAC,,, | Performed by: NURSE PRACTITIONER

## 2024-02-19 PROCEDURE — 99214 OFFICE O/P EST MOD 30 MIN: CPT | Mod: S$GLB,,, | Performed by: NURSE PRACTITIONER

## 2024-02-19 PROCEDURE — 1159F MED LIST DOCD IN RCRD: CPT | Mod: CPTII,S$GLB,, | Performed by: NURSE PRACTITIONER

## 2024-02-19 NOTE — PROGRESS NOTES
Chief Complaint:   BPH    HPI:   Patient is presenting as a follow-up to tamsulosin.  States that he feels that he is voiding better, however, unable to give a sample and PVR was 242 mL.  States that he does not feel the urge to void.  01/08/2024  Patient is a 73-year-old male that is presenting with nocturia, up to 3-4 times nightly and a slow urinary stream.  No BPH meds.  In reviewing EMR, PSA was normal 0.7.  Denies  cancers in his family.  Denies gross hematuria.  Urine in clinic is negative, PVR was elevated to 210 mL.     Allergies:  Patient has no known allergies.    Medications:  has a current medication list which includes the following prescription(s): amlodipine, fluoxetine, lactulose, levothyroxine, linzess, lisinopril, multivitamin, naloxegol oxalate, ondansetron, pregabalin, rosuvastatin, sodium chloride 0.9% SolP with HYDROmorphone (PF) 2 mg/mL Soln 10 mcg/mL, BUPivacaine 0.5 % (5 mg/mL) Soln 0.0625 %, belsomra, tamsulosin, and tizanidine.    Review of Systems:  General: No fever, chills, fatigability, or weight loss.  Skin: No rashes, itching, or changes in color or texture of skin.  Chest: Denies NDIAYE, cyanosis, wheezing, cough, and sputum production.  Abdomen: Appetite fine. No weight loss. Denies diarrhea, abdominal pain, hematemesis, or blood in stool.  Musculoskeletal: No joint stiffness or swelling. Denies back pain.  : As above.  All other review of systems negative.    PMH:   has a past medical history of Hypertension.    PSH:   has a past surgical history that includes Insertion of intrathecal catheter with trial administration of pain medication and Cholecystectomy.    FamHx: none    SocHx:  reports that he has never smoked. He has never used smokeless tobacco. He reports that he does not currently use alcohol. He reports that he does not use drugs.      Physical Exam:  General: A&Ox3, no apparent distress, no deformities  Neck: No masses, normal thyroid  Lungs: normal inspiration, no  use of accessory muscles  Heart: normal pulse, no arrhythmias  Abdomen: Soft, NT, ND, no masses, no hernias, no hepatosplenomegaly  Lymphatic: Neck and groin nodes negative  Skin: The skin is warm and dry. No jaundice.    Labs/Studies:   See HPI    Impression/Plan:   BPH  Patient to remain on tamsulosin and was scheduled with MD for possible surgical intervention.

## 2024-02-27 ENCOUNTER — LAB VISIT (OUTPATIENT)
Dept: LAB | Facility: HOSPITAL | Age: 74
End: 2024-02-27
Attending: UROLOGY
Payer: MEDICARE

## 2024-02-27 ENCOUNTER — OFFICE VISIT (OUTPATIENT)
Dept: UROLOGY | Facility: CLINIC | Age: 74
End: 2024-02-27
Payer: MEDICARE

## 2024-02-27 DIAGNOSIS — N40.0 BENIGN PROSTATIC HYPERPLASIA WITHOUT LOWER URINARY TRACT SYMPTOMS: Primary | ICD-10-CM

## 2024-02-27 DIAGNOSIS — N40.0 BENIGN PROSTATIC HYPERPLASIA WITHOUT LOWER URINARY TRACT SYMPTOMS: ICD-10-CM

## 2024-02-27 DIAGNOSIS — R33.9 INCOMPLETE EMPTYING OF BLADDER: ICD-10-CM

## 2024-02-27 LAB — COMPLEXED PSA SERPL-MCNC: 1.2 NG/ML (ref 0–4)

## 2024-02-27 PROCEDURE — 1159F MED LIST DOCD IN RCRD: CPT | Mod: CPTII,S$GLB,, | Performed by: UROLOGY

## 2024-02-27 PROCEDURE — 99214 OFFICE O/P EST MOD 30 MIN: CPT | Mod: S$GLB,,, | Performed by: UROLOGY

## 2024-02-27 PROCEDURE — 1101F PT FALLS ASSESS-DOCD LE1/YR: CPT | Mod: CPTII,S$GLB,, | Performed by: UROLOGY

## 2024-02-27 PROCEDURE — 1160F RVW MEDS BY RX/DR IN RCRD: CPT | Mod: CPTII,S$GLB,, | Performed by: UROLOGY

## 2024-02-27 PROCEDURE — 84153 ASSAY OF PSA TOTAL: CPT | Performed by: UROLOGY

## 2024-02-27 PROCEDURE — 3288F FALL RISK ASSESSMENT DOCD: CPT | Mod: CPTII,S$GLB,, | Performed by: UROLOGY

## 2024-02-27 PROCEDURE — 99999 PR PBB SHADOW E&M-EST. PATIENT-LVL III: CPT | Mod: PBBFAC,,, | Performed by: UROLOGY

## 2024-02-27 PROCEDURE — 36415 COLL VENOUS BLD VENIPUNCTURE: CPT | Performed by: UROLOGY

## 2024-02-27 RX ORDER — TAMSULOSIN HYDROCHLORIDE 0.4 MG/1
0.4 CAPSULE ORAL DAILY
Qty: 30 CAPSULE | Refills: 11 | Status: SHIPPED | OUTPATIENT
Start: 2024-02-27 | End: 2025-02-26

## 2024-02-27 NOTE — PROGRESS NOTES
Chief Complaint:   Encounter Diagnoses   Name Primary?    Benign prostatic hyperplasia without lower urinary tract symptoms Yes    Incomplete emptying of bladder        HPI:  HPI Roge Roberson vi 73 y.o. male who presents with chronic incomplete emptying.  Patient has BPH and was started on tamsulosin with improvements in his lower urinary tract symptoms.  His nocturia is down 1-2 times.  He was no longer has intermittency.  He states his stream is strong now.  He denies any urinary infections, kidney stones, urinary retention in the past.  He has not had a PSA in about a year.  Last PSA was less than 1.    History:  Social History     Tobacco Use    Smoking status: Never    Smokeless tobacco: Never   Substance Use Topics    Alcohol use: Not Currently    Drug use: Never     Past Medical History:   Diagnosis Date    Hypertension      Past Surgical History:   Procedure Laterality Date    CHOLECYSTECTOMY      INSERTION OF INTRATHECAL CATHETER WITH TRIAL ADMINISTRATION OF PAIN MEDICATION       No family history on file.    Current Outpatient Medications on File Prior to Visit   Medication Sig Dispense Refill    amLODIPine (NORVASC) 10 MG tablet Take 1 tablet (10 mg total) by mouth once daily. 90 tablet 1    FLUoxetine 20 MG capsule TAKE 1 CAPSULE(20 MG) BY MOUTH TWICE DAILY 180 capsule 1    lactulose (CHRONULAC) 20 gram/30 mL Soln Take 30 mLs (20 g total) by mouth daily as needed (constipation). 60 mL 2    levothyroxine (SYNTHROID) 75 MCG tablet TAKE 1 TABLET(75 MCG) BY MOUTH BEFORE BREAKFAST 90 tablet 3    linaCLOtide (LINZESS) 290 mcg Cap capsule TAKE 1 CAPSULE(290 MCG) BY MOUTH EVERY DAY 90 capsule 1    lisinopriL 10 MG tablet Take 1 tablet (10 mg total) by mouth once daily. 90 tablet 3    multivitamin capsule Take 1 capsule by mouth once daily.      naloxegol oxalate (MOVANTIK ORAL) Take by mouth.      ondansetron (ZOFRAN-ODT) 4 MG TbDL Take 1 tablet (4 mg total) by mouth every 8 (eight) hours as needed  (nausea/vomiting). 12 tablet 0    pregabalin (LYRICA) 150 MG capsule TAKE ONE CAPSULE BY MOUTH THREE TIMES DAILY FOR NERVE PAIN      rosuvastatin (CRESTOR) 20 MG tablet Take 1 tablet (20 mg total) by mouth every evening. 90 tablet 3    sodium chloride 0.9% SolP with HYDROmorphone (PF) 2 mg/mL Soln 10 mcg/mL, BUPivacaine 0.5 % (5 mg/mL) Soln 0.0625 % by Epidural route continuous. 11.1 via pain pump      suvorexant (BELSOMRA) 20 mg Tab Take 1 tablet by mouth nightly. 30 tablet 5    tiZANidine (ZANAFLEX) 4 MG tablet Take 4 mg by mouth 3 (three) times daily.      [DISCONTINUED] tamsulosin (FLOMAX) 0.4 mg Cap Take 1 capsule (0.4 mg total) by mouth once daily. 30 capsule 11     No current facility-administered medications on file prior to visit.        Objective:   There were no vitals filed for this visit.   BMI Readings from Last 1 Encounters:   02/19/24 30.19 kg/m²          Physical Exam  Walks with a walker   Respirations even unlabored   Abdomen is soft nontender      Bladder scan shows 141 cc.    Lab Results   Component Value Date    [facility] 0.7 02/01/2023    [facility] 0.7 01/27/2023        Lab Results   Component Value Date    CREATININE 0.81 11/06/2023      Assessment:       1. Benign prostatic hyperplasia without lower urinary tract symptoms    2. Incomplete emptying of bladder        Plan:     1. Benign prostatic hyperplasia without lower urinary tract symptoms    2. Incomplete emptying of bladder       Orders Placed This Encounter    Prostate Specific Antigen, Diagnostic    tamsulosin (FLOMAX) 0.4 mg Cap      BPH with improvement in symptoms on Flomax.  He is incomplete emptying by bladder scan.  Although otherwise asymptomatic and no history of UTIs.  No further intervention recommended.  We will check on him in about 6 months with another bladder scan.

## 2024-12-17 PROBLEM — M54.16 LUMBAR RADICULOPATHY: Status: ACTIVE | Noted: 2022-12-18

## 2024-12-17 PROBLEM — F41.8 DEPRESSION WITH ANXIETY: Status: ACTIVE | Noted: 2022-09-04

## 2024-12-17 PROBLEM — G95.9 CERVICAL MYELOPATHY: Status: ACTIVE | Noted: 2024-12-17

## 2024-12-18 ENCOUNTER — HOSPITAL ENCOUNTER (OUTPATIENT)
Dept: RADIOLOGY | Facility: HOSPITAL | Age: 74
Discharge: HOME | End: 2024-12-18
Attending: NURSE PRACTITIONER
Payer: MEDICARE

## 2024-12-18 DIAGNOSIS — Z01.810 PRE-OPERATIVE CARDIOVASCULAR EXAMINATION: ICD-10-CM

## 2024-12-18 PROCEDURE — 71046 X-RAY EXAM CHEST 2 VIEWS: CPT | Mod: TC,PO

## 2024-12-18 PROCEDURE — 71046 X-RAY EXAM CHEST 2 VIEWS: CPT | Mod: 26,,, | Performed by: RADIOLOGY
